# Patient Record
Sex: FEMALE | Race: WHITE | Employment: STUDENT | URBAN - METROPOLITAN AREA
[De-identification: names, ages, dates, MRNs, and addresses within clinical notes are randomized per-mention and may not be internally consistent; named-entity substitution may affect disease eponyms.]

---

## 2018-08-20 ENCOUNTER — HOSPITAL ENCOUNTER (EMERGENCY)
Age: 23
Discharge: HOME | End: 2018-08-20
Attending: EMERGENCY MEDICINE | Admitting: EMERGENCY MEDICINE
Payer: OTHER GOVERNMENT

## 2018-08-20 VITALS
TEMPERATURE: 98.4 F | RESPIRATION RATE: 18 BRPM | WEIGHT: 139 LBS | DIASTOLIC BLOOD PRESSURE: 82 MMHG | HEIGHT: 61 IN | HEART RATE: 67 BPM | SYSTOLIC BLOOD PRESSURE: 128 MMHG | OXYGEN SATURATION: 97 % | BODY MASS INDEX: 26.24 KG/M2

## 2018-08-20 DIAGNOSIS — R44.0 AUDITORY HALLUCINATION: Primary | ICD-10-CM

## 2018-08-20 LAB
ALBUMIN SERPL-MCNC: 4 G/DL (ref 3.5–5)
ALBUMIN/GLOB SERPL: 1.3 {RATIO} (ref 1.1–2.2)
ALP SERPL-CCNC: 45 U/L (ref 45–117)
ALT SERPL-CCNC: 28 U/L (ref 12–78)
ANION GAP SERPL CALC-SCNC: 4 MMOL/L (ref 5–15)
AST SERPL-CCNC: 27 U/L (ref 15–37)
BASOPHILS # BLD: 0 K/UL (ref 0–0.1)
BASOPHILS NFR BLD: 0 % (ref 0–1)
BILIRUB SERPL-MCNC: 0.8 MG/DL (ref 0.2–1)
BUN SERPL-MCNC: 15 MG/DL (ref 6–20)
BUN/CREAT SERPL: 19 (ref 12–20)
CALCIUM SERPL-MCNC: 8.2 MG/DL (ref 8.5–10.1)
CHLORIDE SERPL-SCNC: 107 MMOL/L (ref 97–108)
CO2 SERPL-SCNC: 28 MMOL/L (ref 21–32)
COMMENT, HOLDF: NORMAL
CREAT SERPL-MCNC: 0.79 MG/DL (ref 0.55–1.02)
DIFFERENTIAL METHOD BLD: NORMAL
EOSINOPHIL # BLD: 0.1 K/UL (ref 0–0.4)
EOSINOPHIL NFR BLD: 1 % (ref 0–7)
ERYTHROCYTE [DISTWIDTH] IN BLOOD BY AUTOMATED COUNT: 12.7 % (ref 11.5–14.5)
GLOBULIN SER CALC-MCNC: 3.2 G/DL (ref 2–4)
GLUCOSE SERPL-MCNC: 87 MG/DL (ref 65–100)
HCT VFR BLD AUTO: 42.1 % (ref 35–47)
HGB BLD-MCNC: 13.8 G/DL (ref 11.5–16)
IMM GRANULOCYTES # BLD: 0 K/UL (ref 0–0.04)
IMM GRANULOCYTES NFR BLD AUTO: 0 % (ref 0–0.5)
LYMPHOCYTES # BLD: 2 K/UL (ref 0.8–3.5)
LYMPHOCYTES NFR BLD: 34 % (ref 12–49)
MCH RBC QN AUTO: 31.4 PG (ref 26–34)
MCHC RBC AUTO-ENTMCNC: 32.8 G/DL (ref 30–36.5)
MCV RBC AUTO: 95.9 FL (ref 80–99)
MONOCYTES # BLD: 0.5 K/UL (ref 0–1)
MONOCYTES NFR BLD: 8 % (ref 5–13)
NEUTS SEG # BLD: 3.4 K/UL (ref 1.8–8)
NEUTS SEG NFR BLD: 56 % (ref 32–75)
NRBC # BLD: 0 K/UL (ref 0–0.01)
NRBC BLD-RTO: 0 PER 100 WBC
PLATELET # BLD AUTO: 289 K/UL (ref 150–400)
PMV BLD AUTO: 9.8 FL (ref 8.9–12.9)
POTASSIUM SERPL-SCNC: 4.1 MMOL/L (ref 3.5–5.1)
PROT SERPL-MCNC: 7.2 G/DL (ref 6.4–8.2)
RBC # BLD AUTO: 4.39 M/UL (ref 3.8–5.2)
SAMPLES BEING HELD,HOLD: NORMAL
SODIUM SERPL-SCNC: 139 MMOL/L (ref 136–145)
WBC # BLD AUTO: 6 K/UL (ref 3.6–11)

## 2018-08-20 PROCEDURE — 85025 COMPLETE CBC W/AUTO DIFF WBC: CPT | Performed by: EMERGENCY MEDICINE

## 2018-08-20 PROCEDURE — 36415 COLL VENOUS BLD VENIPUNCTURE: CPT | Performed by: EMERGENCY MEDICINE

## 2018-08-20 PROCEDURE — 99284 EMERGENCY DEPT VISIT MOD MDM: CPT

## 2018-08-20 PROCEDURE — 80053 COMPREHEN METABOLIC PANEL: CPT | Performed by: EMERGENCY MEDICINE

## 2018-08-20 PROCEDURE — 90791 PSYCH DIAGNOSTIC EVALUATION: CPT

## 2018-08-20 NOTE — ED PROVIDER NOTES
HPI Comments: 26 y.o. female with past medical history significant for anxiety and depression who presents from home via private vehicle with chief complaint of hallucinations. Patient reports auditory hallucinations over the last 2 months, telling her to \"hurt herself\". Patient states she was in rehab twice in the past, for cocaine and heroin use, noted to have auditory and visual hallucinations while coming clean. Patient states she spoke with her therapist about this, who did not start her on any medications and said they would get better. Patient states she has not used cocaine and heroin since last year, but has continued to use EtOH and marijuana - most recently 2 days ago. Patient states in the past, she has been on multiple psych medications, but is not currently on any. Patient states she sleeps about 3 hours per night, and eats about one meal per day. Patient denies any SI or HI. Patient specifically denies any fever, chills, nausea, or vomiting. There are no other acute medical concerns at this time. Social hx: Current some day tobacco smoker; Social EtOH use; Occasional Marijuana use, hx of cocaine and heroin use (clean ~1 year)    Note written by Sharifa Quiñones, as dictated by Rupa Chowdhury MD 10:18 AM    The history is provided by the patient. No  was used. Past Medical History:   Diagnosis Date    Anxiety     Depression        History reviewed. No pertinent surgical history. History reviewed. No pertinent family history. Social History     Social History    Marital status: N/A     Spouse name: N/A    Number of children: N/A    Years of education: N/A     Occupational History    Not on file.      Social History Main Topics    Smoking status: Current Some Day Smoker    Smokeless tobacco: Current User    Alcohol use Yes      Comment: social    Drug use: Yes     Special: Marijuana      Comment: occasional    Sexual activity: Not on file     Other Topics Concern    Not on file     Social History Narrative    No narrative on file         ALLERGIES: Amoxicillin    Review of Systems   Constitutional: Positive for appetite change. Negative for chills and fever. HENT: Negative for rhinorrhea and sore throat. Respiratory: Negative for cough and shortness of breath. Cardiovascular: Negative for chest pain. Gastrointestinal: Negative for abdominal pain, diarrhea, nausea and vomiting. Genitourinary: Negative for dysuria and urgency. Musculoskeletal: Negative for arthralgias and back pain. Skin: Negative for rash. Neurological: Negative for dizziness, weakness and light-headedness. Psychiatric/Behavioral: Positive for hallucinations and sleep disturbance. Negative for suicidal ideas. All other systems reviewed and are negative. Vitals:    08/20/18 0948   BP: 122/75   Pulse: 93   Resp: 14   Temp: 98.1 °F (36.7 °C)   SpO2: 97%   Weight: 63 kg (139 lb)   Height: 5' 1\" (1.549 m)            Physical Exam   Vital signs reviewed. Nursing notes reviewed. Const:  No acute distress, well developed, well nourished  Head:  Atraumatic, normocephalic  Eyes:  PERRL, conjunctiva normal, no scleral icterus  Neck:  Supple, trachea midline  Cardiovascular:  RRR, no murmurs, no gallops, no rubs  Resp:  No resp distress, no increased work of breathing, no wheezes, no rhonchi, no rales,  Abd:  Soft, non-tender, non-distended, no rebound, no guarding, no CVA tenderness  :  Deferred  MSK:  No pedal edema, normal ROM  Neuro:  Alert and oriented x3, no cranial nerve defect  Skin:  Warm, dry, intact  Psych: normal mood and affect, behavior is normal. Auditory hallucinations.      Note written by Sharifa Hayden, as dictated by Savita Neal MD 10:18 AM     MDM  Number of Diagnoses or Management Options  Auditory hallucination:      Amount and/or Complexity of Data Reviewed  Clinical lab tests: ordered and reviewed  Tests in the radiology section of CPT®: ordered and reviewed  Review and summarize past medical records: yes    Patient Progress  Patient progress: stable    ED Course     Pt. Presents to the ER with complaints of auditory hallucinations. No SI or HI. Pt. Is well appearing in the ER. Pt. Seen by [facility] and is not a candidate for inpatient admission. Pt. To f/u with psychiatry or return to the ER with worsening sx. Procedures    CONSULT NOTE:  10:27 AM Manolo Morales MD spoke with Swapna Gayle, Consult for [facility]. Discussed available diagnostic tests and clinical findings. 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 Swapna Gayle recommends having the pt make an appointment with her therapist to discuss her symptoms and start a new medication. She discussed returning to the ED if symptoms worsen.

## 2018-08-20 NOTE — ED TRIAGE NOTES
Pt reports hearing voices saying \"kill yourself\" \"harm yourself\". Pt see's a therapist at [address]. Not currently on any medications. No thoughts or plan to harm self at this time. Pt contracts for safety while in ED.

## 2018-08-20 NOTE — ED NOTES
Pt changed into hospital gown. Clothing and belongings secured at nurses station. Room prepped for mental health, 2 safety walls lowered.  Pt contracts for safety

## 2018-08-20 NOTE — BSMART NOTE
Was asked to see this pt for an evaluation. Pt reports that she is here for a \"second opinion\" for mental health. Pt states she sees \"Rain [facility]\" at [facility]. Pt does not have a follow up appointment nor can she state when she last saw her nurse practitioner/psychiatrist.  Pt reports she has heard voices \"off and on\" but more so in the last few months. Pt states she hasn't nor would act on anything her voices tell her. Pt reports past hx of substances but has been clean for the past one year from heroin but admits to drinking and smoking THC as recent as this past weekend. Pt admits that she has not informed her practitioner that she heard voices. Pt is not identifying any suicidal or homicidal ideations. Pt is identifying auditory hallucinations but not visual or delusional thinking. Pt has not missed work or identifies any inability to continue to function on a daily basis due to these hallucinations. As pt has not reported these details to her practitioner, she will contact her doctor and schedule an appointment to get on medication. Additionally, pt is followed by a counselor with whom she sees every two weeks. She will schedule an appointment this week to see her counselor. Pt is not meeting acute criteria for admission and she will follow up with her practitioner in the community.   Nimesh Alonso LCSW

## 2018-08-20 NOTE — ED NOTES
Bedside shift change report given to Blanca Kaur [address] [place] [address]  (oncoming nurse) by Madalyn Hernandez (offgoing nurse). Report included the following information SBAR, ED Summary and MAR.